# Patient Record
Sex: MALE | Race: ASIAN | NOT HISPANIC OR LATINO | URBAN - METROPOLITAN AREA
[De-identification: names, ages, dates, MRNs, and addresses within clinical notes are randomized per-mention and may not be internally consistent; named-entity substitution may affect disease eponyms.]

---

## 2022-02-01 ENCOUNTER — EMERGENCY (EMERGENCY)
Facility: HOSPITAL | Age: 37
LOS: 1 days | Discharge: HOME | End: 2022-02-01
Attending: EMERGENCY MEDICINE | Admitting: EMERGENCY MEDICINE
Payer: COMMERCIAL

## 2022-02-01 VITALS
HEART RATE: 75 BPM | OXYGEN SATURATION: 99 % | TEMPERATURE: 98 F | DIASTOLIC BLOOD PRESSURE: 95 MMHG | RESPIRATION RATE: 18 BRPM | SYSTOLIC BLOOD PRESSURE: 139 MMHG

## 2022-02-01 PROCEDURE — 99283 EMERGENCY DEPT VISIT LOW MDM: CPT | Mod: 25

## 2022-02-01 PROCEDURE — 12011 RPR F/E/E/N/L/M 2.5 CM/<: CPT

## 2022-02-01 RX ORDER — ACETAMINOPHEN 500 MG
975 TABLET ORAL ONCE
Refills: 0 | Status: COMPLETED | OUTPATIENT
Start: 2022-02-01 | End: 2022-02-01

## 2022-02-01 RX ADMIN — Medication 975 MILLIGRAM(S): at 19:14

## 2022-02-01 NOTE — ED PROVIDER NOTE - PHYSICAL EXAMINATION
On Physical Exam:  General: well appearing, in NAD, speaking clearly in full sentences and without difficulty; cooperative with exam  HEENT: PERRL, MMM  Neck: no neck tenderness, no nuchal rigidity  Cardiac: normal s1, s2; RRR; no MGR  Lungs: CTABL  Abdomen: soft nontender/nondistended  : no bladder tenderness or distension  Skin: linear 3cm laceration to forehead minimal bleeding, no induration, wound appears clean, warm, intact, no rash  Extremities: no peripheral edema, no gross deformities  Neuro: no gross neurologic deficits

## 2022-02-01 NOTE — ED PROCEDURE NOTE - CPROC ED POST PROC CARE GUIDE1
bacitracin applied/Verbal/written post procedure instructions were given to patient/caregiver./Instructed patient/caregiver to follow-up with primary care physician./Instructed patient/caregiver regarding signs and symptoms of infection./Keep the cast/splint/dressing clean and dry.

## 2022-02-01 NOTE — ED PROVIDER NOTE - CLINICAL SUMMARY MEDICAL DECISION MAKING FREE TEXT BOX
36 year old male with no pmhx presents to the ED with lac repair to forehead. patient reports was walking down steps with cake and a butter knife when he fell backwards and knife struck him on the forehead. will admin pain meds, laceration repair, no tetanus as patient had last year, bacitracin, reassess.

## 2022-02-01 NOTE — ED PROVIDER NOTE - NSFOLLOWUPINSTRUCTIONS_ED_ALL_ED_FT
-- Please follow up with your primary doctor (or come back to the Emergency Department) within 48-72 hours for wound check.   -- Keep sutures/staples covered & dry for 24 hours.  Afterwards, once a day, please clean the injury gently with unscented soap & water, apply bacitracin, and then re-cover the wound as needed.  -- Return to Emergency Department for suture removal in  3-5days.   -- Remember, a wound doesn't become even close to as strong as normal skin until 6 weeks after the injury.  Please take care to avoid any further trauma to the area to allow the wound to heal.  -- Any increased pain, redness, streaking (red lines), swelling, fever, chills please return right away to Emergency Department.  -- You were given a copy of the results from any tests performed today in the Emergency Department which have results available.  Show these to your doctor(s).   Some of the tests we sent may not have results yet so please call or have your doctor call the Emergency Department to follow up on all results.  -- Please continue taking your home medications as directed.  Do not use alcohol when taking any medication (especially antibiotics, tylenol or other pain medication) unless you check with the doctor or pharmacist.

## 2022-02-01 NOTE — ED PROVIDER NOTE - ATTENDING CONTRIBUTION TO CARE
agree with resident note    "36 year old male with no pmhx presents to the ED with lac repair to forehead. patient reports was walking down steps with cake and a butter knife when he fell backwards and knife struck him on the forehead. patient reports no LOC, no AC use, denies any trauma or striking head/ neck back. reports able to stand and ambulate post fall no dizziness fatigue pre or post incident. patient reports putting tissues on it which did not stop bleeding so patient decided to come to the ED for eval. patient denies chest pain, Shortness of Breath, abdominal pain, Nausea/Vomiting/Diarrhea, dizziness, weakness, confusion, vision changes, urinary symptoms, syncope, falls, trauma, discharge, fevers. patient reports last tetanus 1 year ago."    PE: well appearing; linear 3cm laceration to forehead; no eye involvement    Imp: lac repair; update tetanus; return for suture removal

## 2022-02-01 NOTE — ED PROVIDER NOTE - OBJECTIVE STATEMENT
36 year old male with no pmhx presents to the ED with lac repair 36 year old male with no pmhx presents to the ED with lac repair to forehead. patient reports was walking down steps with cake and a butter knife when he fell backwards and knife struck him on the forehead. patient reports no LOC, no AC use, denies any trauma or striking head/ neck back. reports able to stand and ambulate post fall no dizziness fatigue pre or post incident. patient reports putting tissues on it which did not stop bleeding so patient decided to come to the ED for eval. patient denies chest pain, Shortness of Breath, abdominal pain, Nausea/Vomiting/Diarrhea, dizziness, weakness, confusion, vision changes, urinary symptoms, syncope, falls, trauma, discharge, fevers. patient reports last tetanus 1 year ago.

## 2022-02-01 NOTE — ED PROVIDER NOTE - PATIENT PORTAL LINK [facility]
You can access the FollowMyHealth Patient Portal offered by [facility] by registering at the following website: [email or web address]. By joining [facility] FollowMyHealth portal, you will also be able to view your health information using other applications (apps) compatible with our system.

## 2022-02-01 NOTE — ED PROVIDER NOTE - NS ED ROS FT
Review of Systems:  · Constitutional: no chills, no fever, no night sweats, no weight loss  · Nose: no epistaxis  · Mouth/Throat: no difficulty in swallowing, trachea midline, uvula midline  . Cardio: No CP, no palpitations, no chest pressure, no ripping chest pain  · Respiratory: no cough, no exertional dyspnea, no hemoptysis, no orthopnea, no shortness of breath  · Gastrointestinal: no abdominal pain, no diarrhea, no melena, no nausea, no vomiting  · Genitourinary: no difficulty urinating, no dysuria, no hematuria  · MUSCULOSKELETAL: FROM of all extremities  · Skin: laceration to forehead, no abrasion; no bruising  · Neurological: no change in level of consciousness, no headache, no seizures  · ROS STATEMENT: all other ROS negative except as per HPI

## 2022-02-01 NOTE — ED PROVIDER NOTE - PROGRESS NOTE DETAILS
Pt reassessed at bedside, feels well, pain controlled. Informed of workup in ED, reviewed lab and/or radiology results (when applicable) with patient/caregiver. Informed pt of plan for discharge with instructions to follow up with PMD. Pt/caregiver expressed understanding of plan and agrees with plan for discharge. Strict return precautions discussed with patient in layman's terms, patient demonstrated understanding of return precautions. Attending MD aware and agrees with plan Judah Miranda PA-C

## 2022-02-01 NOTE — ED ADULT TRIAGE NOTE - CHIEF COMPLAINT QUOTE
Pt brought in by EMS after falling and stabbing himself in the forehead with a knife. Pt was walking down stairs with knife and cake in hand. Pt noted to have a laceration to forehead. Pt denies use of blood thinners. Bleeding controlled at this time.

## 2022-12-03 ENCOUNTER — OFFICE (OUTPATIENT)
Dept: URBAN - METROPOLITAN AREA CLINIC 90 | Facility: CLINIC | Age: 37
Setting detail: OPHTHALMOLOGY
End: 2022-12-03
Payer: COMMERCIAL

## 2022-12-03 DIAGNOSIS — Q12.0: ICD-10-CM

## 2022-12-03 DIAGNOSIS — H40.1134: ICD-10-CM

## 2022-12-03 DIAGNOSIS — G43.109: ICD-10-CM

## 2022-12-03 DIAGNOSIS — H43.393: ICD-10-CM

## 2022-12-03 PROCEDURE — 92250 FUNDUS PHOTOGRAPHY W/I&R: CPT | Performed by: OPHTHALMOLOGY

## 2022-12-03 PROCEDURE — 92004 COMPRE OPH EXAM NEW PT 1/>: CPT | Performed by: OPHTHALMOLOGY

## 2022-12-03 ASSESSMENT — KERATOMETRY
OS_K2POWER_DIOPTERS: 41.50
OD_K2POWER_DIOPTERS: 41.75
OD_AXISANGLE_DEGREES: 090
OS_K1POWER_DIOPTERS: 40.75
OD_K1POWER_DIOPTERS: 41.00
OS_AXISANGLE_DEGREES: 076

## 2022-12-03 ASSESSMENT — SPHEQUIV_DERIVED
OD_SPHEQUIV: 0.375
OS_SPHEQUIV: 0.375

## 2022-12-03 ASSESSMENT — VISUAL ACUITY
OD_BCVA: 20/20
OS_BCVA: 20/20

## 2022-12-03 ASSESSMENT — REFRACTION_AUTOREFRACTION
OS_SPHERE: +0.50
OD_CYLINDER: -0.25
OD_SPHERE: +0.50
OS_AXIS: 127
OS_CYLINDER: -0.25
OD_AXIS: 160

## 2022-12-03 ASSESSMENT — AXIALLENGTH_DERIVED
OD_AL: 24.2439
OS_AL: 24.3412

## 2022-12-03 ASSESSMENT — CONFRONTATIONAL VISUAL FIELD TEST (CVF)
OD_FINDINGS: FULL
OS_FINDINGS: FULL

## 2022-12-03 ASSESSMENT — TONOMETRY
OS_IOP_MMHG: 12
OD_IOP_MMHG: 10

## 2023-02-20 ENCOUNTER — OFFICE (OUTPATIENT)
Dept: URBAN - METROPOLITAN AREA CLINIC 90 | Facility: CLINIC | Age: 38
Setting detail: OPHTHALMOLOGY
End: 2023-02-20
Payer: COMMERCIAL

## 2023-02-20 DIAGNOSIS — Q12.0: ICD-10-CM

## 2023-02-20 DIAGNOSIS — H40.013: ICD-10-CM

## 2023-02-20 DIAGNOSIS — H10.45: ICD-10-CM

## 2023-02-20 PROCEDURE — 92012 INTRM OPH EXAM EST PATIENT: CPT | Performed by: OPHTHALMOLOGY

## 2023-02-20 ASSESSMENT — SPHEQUIV_DERIVED
OD_SPHEQUIV: 0.375
OS_SPHEQUIV: 0.375

## 2023-02-20 ASSESSMENT — KERATOMETRY
OD_AXISANGLE_DEGREES: 090
OS_K2POWER_DIOPTERS: 41.50
OS_AXISANGLE_DEGREES: 076
OD_K2POWER_DIOPTERS: 41.75
OS_K1POWER_DIOPTERS: 40.75
OD_K1POWER_DIOPTERS: 41.00

## 2023-02-20 ASSESSMENT — AXIALLENGTH_DERIVED
OS_AL: 24.3412
OD_AL: 24.2439

## 2023-02-20 ASSESSMENT — REFRACTION_AUTOREFRACTION
OD_CYLINDER: -0.25
OS_CYLINDER: -0.25
OD_AXIS: 160
OS_SPHERE: +0.50
OS_AXIS: 127
OD_SPHERE: +0.50

## 2023-02-20 ASSESSMENT — CONFRONTATIONAL VISUAL FIELD TEST (CVF)
OS_FINDINGS: FULL
OD_FINDINGS: FULL

## 2023-02-20 ASSESSMENT — VISUAL ACUITY
OS_BCVA: 20/20
OD_BCVA: 20/20

## 2023-09-06 ENCOUNTER — OFFICE (OUTPATIENT)
Dept: URBAN - METROPOLITAN AREA CLINIC 90 | Facility: CLINIC | Age: 38
Setting detail: OPHTHALMOLOGY
End: 2023-09-06
Payer: COMMERCIAL

## 2023-09-06 DIAGNOSIS — B30.9: ICD-10-CM

## 2023-09-06 PROCEDURE — 92012 INTRM OPH EXAM EST PATIENT: CPT | Performed by: OPHTHALMOLOGY

## 2023-09-06 ASSESSMENT — SPHEQUIV_DERIVED
OS_SPHEQUIV: 0.375
OD_SPHEQUIV: 0.375

## 2023-09-06 ASSESSMENT — KERATOMETRY
OS_K2POWER_DIOPTERS: 41.50
OS_K1POWER_DIOPTERS: 40.75
OD_K2POWER_DIOPTERS: 41.75
OD_K1POWER_DIOPTERS: 41.00
OS_AXISANGLE_DEGREES: 076
OD_AXISANGLE_DEGREES: 090

## 2023-09-06 ASSESSMENT — REFRACTION_AUTOREFRACTION
OS_CYLINDER: -0.25
OS_SPHERE: +0.50
OD_CYLINDER: -0.25
OD_AXIS: 160
OD_SPHERE: +0.50
OS_AXIS: 127

## 2023-09-06 ASSESSMENT — CONFRONTATIONAL VISUAL FIELD TEST (CVF)
OD_FINDINGS: FULL
OS_FINDINGS: FULL

## 2023-09-06 ASSESSMENT — VISUAL ACUITY
OS_BCVA: 20/20
OD_BCVA: 20/20

## 2023-09-06 ASSESSMENT — AXIALLENGTH_DERIVED
OS_AL: 24.3412
OD_AL: 24.2439